# Patient Record
Sex: MALE | Race: AMERICAN INDIAN OR ALASKA NATIVE | NOT HISPANIC OR LATINO | URBAN - NONMETROPOLITAN AREA
[De-identification: names, ages, dates, MRNs, and addresses within clinical notes are randomized per-mention and may not be internally consistent; named-entity substitution may affect disease eponyms.]

---

## 2024-03-15 ENCOUNTER — HOSPITAL ENCOUNTER (EMERGENCY)
Facility: HOSPITAL | Age: 49
Discharge: HOME | End: 2024-03-15

## 2024-03-15 VITALS
HEIGHT: 67 IN | HEART RATE: 71 BPM | OXYGEN SATURATION: 97 % | SYSTOLIC BLOOD PRESSURE: 158 MMHG | TEMPERATURE: 98 F | BODY MASS INDEX: 28.25 KG/M2 | DIASTOLIC BLOOD PRESSURE: 77 MMHG | RESPIRATION RATE: 16 BRPM | WEIGHT: 180 LBS

## 2024-03-15 DIAGNOSIS — R10.9 LEFT FLANK PAIN: ICD-10-CM

## 2024-03-15 DIAGNOSIS — E87.1 HYPONATREMIA: ICD-10-CM

## 2024-03-15 DIAGNOSIS — K52.9 GASTROENTERITIS: Primary | ICD-10-CM

## 2024-03-15 DIAGNOSIS — I10 HYPERTENSION: ICD-10-CM

## 2024-03-15 DIAGNOSIS — E83.42 HYPOMAGNESEMIA: ICD-10-CM

## 2024-03-15 LAB
ALBUMIN SERPL BCP-MCNC: 3.1 G/DL (ref 3.5–5)
ALBUMIN/GLOB SERPL: 0.7 {RATIO}
ALP SERPL-CCNC: 138 U/L (ref 45–115)
ALT SERPL W P-5'-P-CCNC: 51 U/L (ref 16–61)
ANION GAP SERPL CALCULATED.3IONS-SCNC: 13 MMOL/L (ref 7–16)
AST SERPL W P-5'-P-CCNC: 76 U/L (ref 15–37)
BACTERIA #/AREA URNS HPF: NORMAL /HPF
BASOPHILS # BLD AUTO: 0.04 K/UL (ref 0–0.2)
BASOPHILS NFR BLD AUTO: 0.7 % (ref 0–1)
BILIRUB SERPL-MCNC: 1 MG/DL (ref ?–1.2)
BILIRUB UR QL STRIP: NEGATIVE
BUN SERPL-MCNC: 15 MG/DL (ref 7–18)
BUN/CREAT SERPL: 11 (ref 6–20)
CALCIUM SERPL-MCNC: 9.3 MG/DL (ref 8.5–10.1)
CHLORIDE SERPL-SCNC: 91 MMOL/L (ref 98–107)
CLARITY UR: CLEAR
CO2 SERPL-SCNC: 26 MMOL/L (ref 21–32)
COLOR UR: YELLOW
CREAT SERPL-MCNC: 1.39 MG/DL (ref 0.7–1.3)
DIFFERENTIAL METHOD BLD: ABNORMAL
EGFR (NO RACE VARIABLE) (RUSH/TITUS): 63 ML/MIN/1.73M2
EOSINOPHIL # BLD AUTO: 0.05 K/UL (ref 0–0.5)
EOSINOPHIL NFR BLD AUTO: 0.9 % (ref 1–4)
EOSINOPHIL NFR BLD MANUAL: 1 % (ref 1–4)
ERYTHROCYTE [DISTWIDTH] IN BLOOD BY AUTOMATED COUNT: 11 % (ref 11.5–14.5)
GLOBULIN SER-MCNC: 4.7 G/DL (ref 2–4)
GLUCOSE SERPL-MCNC: 118 MG/DL (ref 74–106)
GLUCOSE UR STRIP-MCNC: NEGATIVE MG/DL
HCT VFR BLD AUTO: 34.3 % (ref 40–54)
HGB BLD-MCNC: 12.1 G/DL (ref 13.5–18)
KETONES UR STRIP-SCNC: NEGATIVE MG/DL
LACTATE SERPL-SCNC: 1.1 MMOL/L (ref 0.4–2)
LEUKOCYTE ESTERASE UR QL STRIP: NEGATIVE
LIPASE SERPL-CCNC: 96 U/L (ref 16–77)
LYMPHOCYTES # BLD AUTO: 1.63 K/UL (ref 1–4.8)
LYMPHOCYTES NFR BLD AUTO: 28.9 % (ref 27–41)
LYMPHOCYTES NFR BLD MANUAL: 31 % (ref 27–41)
MAGNESIUM SERPL-MCNC: 1.4 MG/DL (ref 1.7–2.3)
MCH RBC QN AUTO: 35.2 PG (ref 27–31)
MCHC RBC AUTO-ENTMCNC: 35.3 G/DL (ref 32–36)
MCV RBC AUTO: 99.7 FL (ref 80–96)
MONOCYTES # BLD AUTO: 1.14 K/UL (ref 0–0.8)
MONOCYTES NFR BLD AUTO: 20.2 % (ref 2–6)
MONOCYTES NFR BLD MANUAL: 14 % (ref 2–6)
MPC BLD CALC-MCNC: 8.9 FL (ref 9.4–12.4)
NEUTROPHILS # BLD AUTO: 2.78 K/UL (ref 1.8–7.7)
NEUTROPHILS NFR BLD AUTO: 49.3 % (ref 53–65)
NEUTS SEG NFR BLD MANUAL: 54 % (ref 50–62)
NITRITE UR QL STRIP: NEGATIVE
NRBC BLD MANUAL-RTO: ABNORMAL %
PH UR STRIP: 7 PH UNITS
PLATELET # BLD AUTO: 175 K/UL (ref 150–400)
POTASSIUM SERPL-SCNC: 3.8 MMOL/L (ref 3.5–5.1)
PROT SERPL-MCNC: 7.8 G/DL (ref 6.4–8.2)
PROT UR QL STRIP: 100
RBC # BLD AUTO: 3.44 M/UL (ref 4.6–6.2)
RBC # UR STRIP: ABNORMAL /UL
RBC #/AREA URNS HPF: NORMAL /HPF
SODIUM SERPL-SCNC: 126 MMOL/L (ref 136–145)
SP GR UR STRIP: 1.01
SQUAMOUS #/AREA URNS LPF: NORMAL /LPF
TROPONIN I SERPL DL<=0.01 NG/ML-MCNC: 9.9 PG/ML
TSH SERPL DL<=0.005 MIU/L-ACNC: 3.73 UIU/ML (ref 0.36–3.74)
UROBILINOGEN UR STRIP-ACNC: 0.2 MG/DL
WBC # BLD AUTO: 5.64 K/UL (ref 4.5–11)
WBC #/AREA URNS HPF: NORMAL /HPF

## 2024-03-15 PROCEDURE — 93010 ELECTROCARDIOGRAM REPORT: CPT | Mod: ,,, | Performed by: STUDENT IN AN ORGANIZED HEALTH CARE EDUCATION/TRAINING PROGRAM

## 2024-03-15 PROCEDURE — 93005 ELECTROCARDIOGRAM TRACING: CPT

## 2024-03-15 PROCEDURE — 83690 ASSAY OF LIPASE: CPT | Performed by: NURSE PRACTITIONER

## 2024-03-15 PROCEDURE — 96366 THER/PROPH/DIAG IV INF ADDON: CPT

## 2024-03-15 PROCEDURE — 83735 ASSAY OF MAGNESIUM: CPT | Performed by: NURSE PRACTITIONER

## 2024-03-15 PROCEDURE — 81003 URINALYSIS AUTO W/O SCOPE: CPT | Performed by: NURSE PRACTITIONER

## 2024-03-15 PROCEDURE — 63600175 PHARM REV CODE 636 W HCPCS: Performed by: NURSE PRACTITIONER

## 2024-03-15 PROCEDURE — 99285 EMERGENCY DEPT VISIT HI MDM: CPT | Mod: ,,, | Performed by: NURSE PRACTITIONER

## 2024-03-15 PROCEDURE — 84484 ASSAY OF TROPONIN QUANT: CPT | Performed by: NURSE PRACTITIONER

## 2024-03-15 PROCEDURE — 99285 EMERGENCY DEPT VISIT HI MDM: CPT | Mod: 25

## 2024-03-15 PROCEDURE — 84443 ASSAY THYROID STIM HORMONE: CPT | Performed by: NURSE PRACTITIONER

## 2024-03-15 PROCEDURE — 80053 COMPREHEN METABOLIC PANEL: CPT | Performed by: NURSE PRACTITIONER

## 2024-03-15 PROCEDURE — 85025 COMPLETE CBC W/AUTO DIFF WBC: CPT | Performed by: NURSE PRACTITIONER

## 2024-03-15 PROCEDURE — 96365 THER/PROPH/DIAG IV INF INIT: CPT

## 2024-03-15 PROCEDURE — 25000003 PHARM REV CODE 250: Performed by: NURSE PRACTITIONER

## 2024-03-15 PROCEDURE — 83605 ASSAY OF LACTIC ACID: CPT | Performed by: NURSE PRACTITIONER

## 2024-03-15 PROCEDURE — 96375 TX/PRO/DX INJ NEW DRUG ADDON: CPT

## 2024-03-15 RX ORDER — MAGNESIUM SULFATE HEPTAHYDRATE 40 MG/ML
2 INJECTION, SOLUTION INTRAVENOUS
Status: COMPLETED | OUTPATIENT
Start: 2024-03-15 | End: 2024-03-15

## 2024-03-15 RX ORDER — KETOROLAC TROMETHAMINE 30 MG/ML
15 INJECTION, SOLUTION INTRAMUSCULAR; INTRAVENOUS
Status: COMPLETED | OUTPATIENT
Start: 2024-03-15 | End: 2024-03-15

## 2024-03-15 RX ORDER — LORATADINE 10 MG/1
1 TABLET ORAL DAILY
COMMUNITY
Start: 2023-09-10

## 2024-03-15 RX ORDER — ONDANSETRON HYDROCHLORIDE 2 MG/ML
4 INJECTION, SOLUTION INTRAVENOUS
Status: COMPLETED | OUTPATIENT
Start: 2024-03-15 | End: 2024-03-15

## 2024-03-15 RX ORDER — ONDANSETRON 4 MG/1
4 TABLET, ORALLY DISINTEGRATING ORAL EVERY 6 HOURS PRN
Qty: 20 TABLET | Refills: 0 | Status: SHIPPED | OUTPATIENT
Start: 2024-03-15

## 2024-03-15 RX ORDER — PANTOPRAZOLE SODIUM 40 MG/1
1 TABLET, DELAYED RELEASE ORAL 2 TIMES DAILY
COMMUNITY
Start: 2024-02-25

## 2024-03-15 RX ORDER — CHOLECALCIFEROL (VITAMIN D3) 25 MCG
1000 TABLET ORAL
COMMUNITY

## 2024-03-15 RX ORDER — ACETAMINOPHEN 325 MG/1
650 TABLET ORAL
COMMUNITY
Start: 2023-09-10

## 2024-03-15 RX ORDER — SIMETHICONE 80 MG
80 TABLET,CHEWABLE ORAL
COMMUNITY
Start: 2023-04-10

## 2024-03-15 RX ORDER — LANOLIN ALCOHOL/MO/W.PET/CERES
400 CREAM (GRAM) TOPICAL 2 TIMES DAILY
Qty: 60 TABLET | Refills: 0 | Status: SHIPPED | OUTPATIENT
Start: 2024-03-15

## 2024-03-15 RX ORDER — AZELASTINE 1 MG/ML
SPRAY, METERED NASAL
COMMUNITY
Start: 2023-07-11

## 2024-03-15 RX ADMIN — KETOROLAC TROMETHAMINE 15 MG: 30 INJECTION, SOLUTION INTRAMUSCULAR; INTRAVENOUS at 10:03

## 2024-03-15 RX ADMIN — ONDANSETRON 4 MG: 2 INJECTION INTRAMUSCULAR; INTRAVENOUS at 07:03

## 2024-03-15 RX ADMIN — SODIUM CHLORIDE 1000 ML: 9 INJECTION, SOLUTION INTRAVENOUS at 08:03

## 2024-03-15 RX ADMIN — MAGNESIUM SULFATE HEPTAHYDRATE 2 G: 40 INJECTION, SOLUTION INTRAVENOUS at 08:03

## 2024-03-16 NOTE — ED TRIAGE NOTES
Arrived via EMS with c/o left flank pain since Tuesday; pt reports pain started around left ribs then radiated to left flank/ kidney area, vomiting x 2 days, nausea. Has not been seen by PCP.

## 2024-03-16 NOTE — ED PROVIDER NOTES
Encounter Date: 3/15/2024       History     Chief Complaint   Patient presents with    Flank Pain     49 y/o MARY GRACE arrived to the ED via [employer] with complaint of left flank pan, nausea, vomiting and diarrhea since Tuesday night. Denies fever. Left flank pain radiates to left mid abdomen. Denies injury. Pain is intermittent, rates pain 6/10. Has not taken anything for pain. Took 2 Valtrex 1 gm today due to he thought it was an antibiotic. Denies fever, dysuria or hematuria.    The history is provided by the patient.     Review of patient's allergies indicates:  No Known Allergies  History reviewed. No pertinent past medical history.  Past Surgical History:   Procedure Laterality Date    knee scope      NOSE SURGERY       History reviewed. No pertinent family history.  Social History     Tobacco Use    Smoking status: Some Days     Types: Cigarettes    Smokeless tobacco: Former   Substance Use Topics    Alcohol use: Yes     Comment: socially    Drug use: Not Currently     Review of Systems   Constitutional:  Negative for activity change, appetite change, chills, fatigue and fever.   HENT:  Negative for congestion, dental problem, ear pain, postnasal drip, rhinorrhea, sinus pressure, sinus pain, sore throat and trouble swallowing.    Eyes:  Negative for photophobia, pain, redness and visual disturbance.   Respiratory:  Negative for cough and shortness of breath.    Cardiovascular:  Negative for chest pain, palpitations and leg swelling.   Gastrointestinal:  Positive for abdominal pain, diarrhea, nausea and vomiting. Negative for anal bleeding, blood in stool and constipation.   Genitourinary:  Negative for decreased urine volume, difficulty urinating, dysuria, flank pain, frequency, hematuria, penile pain, scrotal swelling and testicular pain.   Musculoskeletal:  Positive for back pain (left back/flank pain). Negative for gait problem.   Skin:  Negative for color change, pallor and rash.   Neurological:  Negative for  dizziness, speech difficulty, weakness, light-headedness, numbness and headaches.   Hematological: Negative.    Psychiatric/Behavioral: Negative.         Physical Exam     Initial Vitals [03/15/24 1930]   BP Pulse Resp Temp SpO2   (!) 185/104 94 17 98.4 °F (36.9 °C) 97 %      MAP       --         Physical Exam    Vitals reviewed.  Constitutional: Vital signs are normal. He appears well-developed and well-nourished. He is cooperative.  Non-toxic appearance. He does not have a sickly appearance. He does not appear ill. No distress.   HENT:   Head: Normocephalic and atraumatic.   Right Ear: Tympanic membrane, external ear and ear canal normal.   Left Ear: Tympanic membrane, external ear and ear canal normal.   Nose: Nose normal. Right sinus exhibits no maxillary sinus tenderness and no frontal sinus tenderness. Left sinus exhibits no maxillary sinus tenderness and no frontal sinus tenderness.   Mouth/Throat: Uvula is midline, oropharynx is clear and moist and mucous membranes are normal.   Eyes: Conjunctivae and EOM are normal. Pupils are equal, round, and reactive to light.   Neck: Neck supple.   Normal range of motion.   Full passive range of motion without pain.     Cardiovascular:  Normal rate, regular rhythm, normal heart sounds, intact distal pulses and normal pulses.           No edema to BLE   Pulmonary/Chest: Effort normal and breath sounds normal. He exhibits no tenderness and no bony tenderness.   Abdominal: Abdomen is soft and flat. Bowel sounds are normal. There is no abdominal tenderness.   Musculoskeletal:         General: Normal range of motion.      Cervical back: Full passive range of motion without pain, normal range of motion and neck supple.     Lymphadenopathy:     He has no cervical adenopathy.   Neurological: He is alert and oriented to person, place, and time. He has normal strength. No cranial nerve deficit or sensory deficit. Coordination and gait normal. GCS eye subscore is 4. GCS verbal  subscore is 5. GCS motor subscore is 6.   Skin: Skin is warm, dry and intact. Capillary refill takes less than 2 seconds. No rash noted.   Psychiatric: He has a normal mood and affect. His speech is normal and behavior is normal. Thought content normal. Cognition and memory are normal.         Medical Screening Exam   See Full Note    ED Course   Procedures  Labs Reviewed   COMPREHENSIVE METABOLIC PANEL - Abnormal; Notable for the following components:       Result Value    Sodium 126 (*)     Chloride 91 (*)     Glucose 118 (*)     Creatinine 1.39 (*)     Albumin 3.1 (*)     Globulin 4.7 (*)     Alk Phos 138 (*)     AST 76 (*)     All other components within normal limits   LIPASE - Abnormal; Notable for the following components:    Lipase 96 (*)     All other components within normal limits   URINALYSIS, REFLEX TO URINE CULTURE - Abnormal; Notable for the following components:    Protein,  (*)     Blood, UA Small (*)     All other components within normal limits   MAGNESIUM - Abnormal; Notable for the following components:    Magnesium 1.4 (*)     All other components within normal limits   CBC WITH DIFFERENTIAL - Abnormal; Notable for the following components:    RBC 3.44 (*)     Hemoglobin 12.1 (*)     Hematocrit 34.3 (*)     MCV 99.7 (*)     MCH 35.2 (*)     RDW 11.0 (*)     MPV 8.9 (*)     Neutrophils % 49.3 (*)     Monocytes % 20.2 (*)     Eosinophils % 0.9 (*)     Monocytes, Absolute 1.14 (*)     All other components within normal limits   MANUAL DIFFERENTIAL - Abnormal; Notable for the following components:    Monocytes, Man % 14 (*)     All other components within normal limits   TSH - Normal   TROPONIN I - Normal   LACTIC ACID, PLASMA - Normal   URINALYSIS, MICROSCOPIC - Normal   CBC W/ AUTO DIFFERENTIAL    Narrative:     The following orders were created for panel order CBC auto differential.  Procedure                               Abnormality         Status                     ---------                                -----------         ------                     CBC with Differential[[id number]]       Abnormal            Final result               Manual Differential[[id number]]         Abnormal            Final result                 Please view results for these tests on the individual orders.        ECG Results              EKG 12-lead (In process)        Collection Time Result Time QRS Duration OHS QTC Calculation    03/15/24 19:44:37 03/15/24 19:50:34 80 452                     In process by Interface, Lab In [facility] (03/15/24 19:50:42)                   Narrative:    Test Reason : I10,    Vent. Rate : 089 BPM     Atrial Rate : 089 BPM     P-R Int : 148 ms          QRS Dur : 080 ms      QT Int : 372 ms       P-R-T Axes : 027 -08 029 degrees     QTc Int : 452 ms    Normal sinus rhythm  Normal ECG  No previous ECGs available    Referred By: [email or web address]   SELF           Confirmed By:                                   Imaging Results              CT Abdomen Pelvis  Without Contrast (Final result)  Result time 03/15/24 20:30:23      Final result by Saul Pelletier II, MD (03/15/24 20:30:23)                   Impression:      No acute process.      Electronically signed by: Saul Pelletier  Date:    03/15/2024  Time:    20:30               Narrative:    EXAMINATION:  CT ABDOMEN PELVIS WITHOUT CONTRAST    CLINICAL HISTORY:  Flank pain, kidney stone suspected;    TECHNIQUE:  Axial CT imaging of the abdomen and pelvis is performed without contrast.    CT dose reduction technique used - Dose Rite and tube current modulation.    COMPARISON:  None available    FINDINGS:  Cardiac and lung bases are within normal limits    CT abdomen: The liver, spleen, pancreas and adrenal glands are normal in size and density.  No evidence of focal lesion is demonstrated in these solid organs.    Kidneys are normal in size and density.  No evidence of hydronephrosis or nephrolithiasis is seen.    The bowel caliber is normal and no  wall thickening or adjacent inflammatory change is seen.  No evidence of free fluid or free air is present.  Appendix is normal.    CT pelvis: The bowel and bladder appear within normal limits.  The prostate gland has small amount of calcification present.                                       X-Ray Chest AP Portable (Final result)  Result time 03/15/24 19:46:50      Final result by Saul Pelletier II, MD (03/15/24 19:46:50)                   Impression:      No evidence of cardiopulmonary disease.      Electronically signed by: Saul Pelletier  Date:    03/15/2024  Time:    19:46               Narrative:    EXAMINATION:  XR CHEST AP PORTABLE    CLINICAL HISTORY:  hypertension;    COMPARISON:  None available    TECHNIQUE:  XR CHEST AP PORTABLE    FINDINGS:  The heart and mediastinum are normal in size and configuration.  The pulmonary vascularity is normal in caliber.  No lung infiltrates, effusions, pneumothorax or other abnormality is demonstrated.                                       Medications   magnesium sulfate 2g in water 50mL IVPB (premix) (2 g Intravenous New Bag 3/15/24 2024)   ondansetron injection 4 mg (4 mg Intravenous Given 3/15/24 1943)   sodium chloride 0.9% bolus 1,000 mL 1,000 mL (0 mLs Intravenous Stopped 3/15/24 2124)   ketorolac injection 15 mg (15 mg Intravenous Given 3/15/24 2207)     Medical Decision Making  47 y/o MARY GRACE arrived to the ED via [employer] with complaint of left flank pan, nausea, vomiting and diarrhea since Tuesday night. Denies fever. Left flank pain radiates to left mid abdomen. Denies any injury. Pain is intermittent, rates pain 6/10. Has not taken anything for pain. Took 2 Valtrex 1 gm today due to he thought it was an antibiotic. Denies fever, dysuria or hematuria.    Problems Addressed:  Gastroenteritis: self-limited or minor problem     Details: -Drink 2 glass of water to 1 glass of Gatorade  -Prescription given for Zofran ODT 4 mg every 6 hours as needed for nausea  with vomiting  -Clear liquids x 12 hours, then advance diet to soft bland diet as tolerated. Avoid fried, greasy or spicy foods    Hypertension: chronic illness or injury     Details: Elevated blood pressure:  -Limit salt intake due to elevated blood pressure  -Avoid over the counter decongestants for sinus which can cause increase in blood pressure  -May take Coricidin HBP over the counter if needed for sinuses  -Follow up with your provider for recheck on blood pressure  Hypomagnesemia: acute illness or injury     Details: Low Magnesium  -Prescription given for Mag Ox 400 mg take one tab by mouth twice a day  -Follow up with PCP for recheck on lab  Hyponatremia: acute illness or injury     Details: -Drink 2 glass of water to 1 glass of Gatorade to stay hydrated related to vomiting      Left flank pain:     Details: Flank pain/muscle strain:  -Take Tylenol 500 mg 2 tabs by mouth every 8 hours for pain  -May use Biofreeze over the counter to help with muscle ache    Amount and/or Complexity of Data Reviewed  Labs: ordered.     Details: Labs Reviewed  COMPREHENSIVE METABOLIC PANEL - Abnormal; Notable for the following components:     Sodium                        126 (*)                Chloride                      91 (*)                 Glucose                       118 (*)                Creatinine                    1.39 (*)               Albumin                       3.1 (*)                Globulin                      4.7 (*)                Alk Phos                      138 (*)                AST                           76 (*)              All other components within normal limits  LIPASE - Abnormal; Notable for the following components:     Lipase                        96 (*)              All other components within normal limits  URINALYSIS, REFLEX TO URINE CULTURE - Abnormal; Notable for the following components:     Protein, UA                   100 (*)                Blood, UA                     Small  (*)            All other components within normal limits  MAGNESIUM - Abnormal; Notable for the following components:     Magnesium                     1.4 (*)             All other components within normal limits  CBC WITH DIFFERENTIAL - Abnormal; Notable for the following components:     RBC                           3.44 (*)               Hemoglobin                    12.1 (*)               Hematocrit                    34.3 (*)               MCV                           99.7 (*)               MCH                           35.2 (*)               RDW                           11.0 (*)               MPV                           8.9 (*)                Neutrophils %                 49.3 (*)               Monocytes %                   20.2 (*)               Eosinophils %                 0.9 (*)                Monocytes, Absolute           1.14 (*)            All other components within normal limits  MANUAL DIFFERENTIAL - Abnormal; Notable for the following components:     Monocytes, Man %              14 (*)              All other components within normal limits  TSH - Normal  TROPONIN I - Normal  LACTIC ACID, PLASMA - Normal  URINALYSIS, MICROSCOPIC - Normal  CBC W/ AUTO DIFFERENTIAL   Radiology: ordered.     Details: CXR: No acute cardiopulmonary process noted  CT abdomen and pelvis without contrast: no acute process noted  ECG/medicine tests: ordered.     Details: EKG: NSR with HR 89, QTc 452  Discussion of management or test interpretation with external provider(s): Discharge MDM  I discussed lab, EKG, CXR and CT scan results with patient. Left flank pain appears to be related to muscle strain possibly related to vomiting.    Patient was managed in the ED with :  -NS  x 1 liter bolus  -Mag 2 gm IV x 1  -Toradol 15 mg IV x 1    The response to treatment was patient tolerated well and pain improved. Reviewed discharge instructions with patient. He agreed to treatment plan  and verbalized understanding.    Patient  was discharged in stable condition.  Detailed return precautions discussed.     Risk  OTC drugs.  Prescription drug management.                                      Clinical Impression:   Final diagnoses:  [I10] Hypertension  [R10.9] Left flank pain  [K52.9] Gastroenteritis (Primary)  [E87.1] Hyponatremia  [E83.42] Hypomagnesemia                      Alissa Jim, [facility]  03/15/24 [id number]

## 2024-03-16 NOTE — DISCHARGE INSTRUCTIONS
Low Magnesium  -Start Mag Ox 400 mg take one tab by mouth twice a day  -Follow up with PCP for recheck on lab    Nausea with vomiting and low sodium:  -Drink 2 glass of water to 1 glass of Gatorade  -Zofran ODT as directed for nausea with vomiting  -Clear liquids x 12 hours, then advance diet to soft bland diet as tolerated. Avoid fried, greasy or spicy foods    Elevated blood pressure:  -Limit salt intake due to elevated blood pressure  -Avoid over the counter decongestants for sinus which can cause increase in blood pressure  -May take Coricidin HBP over the counter if needed for sinuses  -Follow up with your provider for recheck on blood pressure    Flank pain/muscle strain:  -Take Tylenol 500 mg 2 tabs by mouth every 8 hours for pain  -May use Biofreeze over the counter to help with muscle ache

## 2024-03-25 LAB
OHS QRS DURATION: 80 MS
OHS QTC CALCULATION: 452 MS